# Patient Record
Sex: FEMALE | Race: WHITE
[De-identification: names, ages, dates, MRNs, and addresses within clinical notes are randomized per-mention and may not be internally consistent; named-entity substitution may affect disease eponyms.]

---

## 2018-02-05 NOTE — MRI
MRI CERVICAL SPINE NONCONTRAST:

 

Date: 2-5-18

 

History: 

42-year-old female with cervicalgia, M54.2

 

Comparison: 

None. 

 

FINDINGS: 

There is no Chiari I malformation. The cervical spinal cord is normal in size and signal. The bone ma
rrow signal is normal. The vertebral body heights and disc spaces are maintained. No prevertebral sof
t tissue swelling. No high grade facet arthrosis at any level. No central spinal canal stenosis. No n
eural foraminal stenosis. No root impingement or cord impingement at any level. No focal disc herniat
ion at any level. There is slight reversal of the cervical curvature. 

 

IMPRESSION: 

1. Mild reversal of curvature, which could be due to positioning or muscle spasm. 

2. Otherwise normal. 

 

POS: [facility]UL[facility]YA

## 2019-01-16 ENCOUNTER — HOSPITAL ENCOUNTER (OUTPATIENT)
Dept: HOSPITAL 92 - BICMAMMO | Age: 44
Discharge: HOME | End: 2019-01-16
Attending: OBSTETRICS & GYNECOLOGY
Payer: COMMERCIAL

## 2019-01-16 DIAGNOSIS — R92.1: ICD-10-CM

## 2019-01-16 DIAGNOSIS — Z80.3: ICD-10-CM

## 2019-01-16 DIAGNOSIS — Z12.31: Primary | ICD-10-CM

## 2019-01-16 PROCEDURE — 77067 SCR MAMMO BI INCL CAD: CPT

## 2019-01-16 PROCEDURE — 77063 BREAST TOMOSYNTHESIS BI: CPT

## 2023-10-20 ENCOUNTER — HOSPITAL ENCOUNTER (OUTPATIENT)
Dept: HOSPITAL 92 - CSHCT | Age: 48
Discharge: HOME | End: 2023-10-20
Attending: INTERNAL MEDICINE
Payer: COMMERCIAL

## 2023-10-20 DIAGNOSIS — R10.9: Primary | ICD-10-CM

## 2023-10-20 DIAGNOSIS — I86.8: ICD-10-CM

## 2023-10-20 DIAGNOSIS — R09.89: ICD-10-CM

## 2023-10-20 DIAGNOSIS — R13.10: ICD-10-CM

## 2023-10-20 DIAGNOSIS — K21.9: ICD-10-CM

## 2023-10-20 PROCEDURE — 74178 CT ABD&PLV WO CNTR FLWD CNTR: CPT

## 2023-10-23 ENCOUNTER — HOSPITAL ENCOUNTER (OUTPATIENT)
Dept: HOSPITAL 92 - RAD | Age: 48
Discharge: HOME | End: 2023-10-23
Attending: INTERNAL MEDICINE
Payer: COMMERCIAL

## 2023-10-23 DIAGNOSIS — K21.9: ICD-10-CM

## 2023-10-23 DIAGNOSIS — R13.10: Primary | ICD-10-CM

## 2023-10-23 PROCEDURE — 74230 X-RAY XM SWLNG FUNCJ C+: CPT
